# Patient Record
Sex: MALE | Race: WHITE | NOT HISPANIC OR LATINO | URBAN - METROPOLITAN AREA
[De-identification: names, ages, dates, MRNs, and addresses within clinical notes are randomized per-mention and may not be internally consistent; named-entity substitution may affect disease eponyms.]

---

## 2023-09-12 PROBLEM — R79.89 LOW TESTOSTERONE IN MALE: Status: ACTIVE | Noted: 2023-09-12

## 2023-09-12 PROBLEM — D22.5 MELANOCYTIC NEVI OF TRUNK: Status: ACTIVE | Noted: 2023-07-25

## 2023-09-12 PROBLEM — R41.89 BRAIN FOG: Status: ACTIVE | Noted: 2023-09-12

## 2023-09-12 PROBLEM — R68.82 DECREASED LIBIDO: Status: ACTIVE | Noted: 2023-09-12

## 2023-09-12 PROBLEM — L57.9 SKIN CHANGES DUE TO CHRONIC EXPOSURE TO NONIONIZING RADIATION, UNSPECIFIED: Status: ACTIVE | Noted: 2023-07-25

## 2023-09-13 ENCOUNTER — LAB (OUTPATIENT)
Dept: LAB | Facility: LAB | Age: 33
End: 2023-09-13

## 2023-09-13 ENCOUNTER — OFFICE VISIT (OUTPATIENT)
Dept: PRIMARY CARE | Facility: CLINIC | Age: 33
End: 2023-09-13
Payer: COMMERCIAL

## 2023-09-13 VITALS
SYSTOLIC BLOOD PRESSURE: 122 MMHG | HEART RATE: 70 BPM | BODY MASS INDEX: 28.31 KG/M2 | DIASTOLIC BLOOD PRESSURE: 80 MMHG | WEIGHT: 203 LBS

## 2023-09-13 DIAGNOSIS — Z11.4 ENCOUNTER FOR SCREENING FOR HIV: ICD-10-CM

## 2023-09-13 DIAGNOSIS — R79.89 LOW TESTOSTERONE IN MALE: ICD-10-CM

## 2023-09-13 DIAGNOSIS — Z11.59 NEED FOR HEPATITIS C SCREENING TEST: ICD-10-CM

## 2023-09-13 DIAGNOSIS — K58.9 IRRITABLE BOWEL SYNDROME WITHOUT DIARRHEA: ICD-10-CM

## 2023-09-13 DIAGNOSIS — Z00.00 ANNUAL PHYSICAL EXAM: ICD-10-CM

## 2023-09-13 DIAGNOSIS — Z00.00 ANNUAL PHYSICAL EXAM: Primary | ICD-10-CM

## 2023-09-13 DIAGNOSIS — R68.82 DECREASED LIBIDO: ICD-10-CM

## 2023-09-13 PROBLEM — M54.50 LOW BACK PAIN: Status: ACTIVE | Noted: 2023-09-13

## 2023-09-13 PROBLEM — R07.89 OTHER CHEST PAIN: Status: ACTIVE | Noted: 2023-09-13

## 2023-09-13 PROBLEM — R19.7 DIARRHEA: Status: ACTIVE | Noted: 2023-09-13

## 2023-09-13 PROBLEM — Z77.098 CONTACT WITH AND (SUSPECTED) EXPOSURE TO OTHER HAZARDOUS, CHIEFLY NONMEDICINAL, CHEMICALS: Status: ACTIVE | Noted: 2023-09-13

## 2023-09-13 PROBLEM — R76.11 POSITIVE REACTION TO TUBERCULIN SKIN TEST: Status: ACTIVE | Noted: 2023-09-13

## 2023-09-13 LAB
ALANINE AMINOTRANSFERASE (SGPT) (U/L) IN SER/PLAS: 21 U/L (ref 10–52)
ALBUMIN (G/DL) IN SER/PLAS: 4.6 G/DL (ref 3.4–5)
ALKALINE PHOSPHATASE (U/L) IN SER/PLAS: 47 U/L (ref 33–120)
ANION GAP IN SER/PLAS: 11 MMOL/L (ref 10–20)
ASPARTATE AMINOTRANSFERASE (SGOT) (U/L) IN SER/PLAS: 22 U/L (ref 9–39)
BASOPHILS (10*3/UL) IN BLOOD BY AUTOMATED COUNT: 0.03 X10E9/L (ref 0–0.1)
BASOPHILS/100 LEUKOCYTES IN BLOOD BY AUTOMATED COUNT: 0.5 % (ref 0–2)
BILIRUBIN TOTAL (MG/DL) IN SER/PLAS: 0.6 MG/DL (ref 0–1.2)
CALCIUM (MG/DL) IN SER/PLAS: 9.7 MG/DL (ref 8.6–10.3)
CARBON DIOXIDE, TOTAL (MMOL/L) IN SER/PLAS: 30 MMOL/L (ref 21–32)
CHLORIDE (MMOL/L) IN SER/PLAS: 106 MMOL/L (ref 98–107)
CHOLESTEROL (MG/DL) IN SER/PLAS: 138 MG/DL (ref 0–199)
CHOLESTEROL IN HDL (MG/DL) IN SER/PLAS: 39.6 MG/DL
CHOLESTEROL/HDL RATIO: 3.5
CREATININE (MG/DL) IN SER/PLAS: 1.05 MG/DL (ref 0.5–1.3)
EOSINOPHILS (10*3/UL) IN BLOOD BY AUTOMATED COUNT: 0.13 X10E9/L (ref 0–0.7)
EOSINOPHILS/100 LEUKOCYTES IN BLOOD BY AUTOMATED COUNT: 2.3 % (ref 0–6)
ERYTHROCYTE DISTRIBUTION WIDTH (RATIO) BY AUTOMATED COUNT: 11.9 % (ref 11.5–14.5)
ERYTHROCYTE MEAN CORPUSCULAR HEMOGLOBIN CONCENTRATION (G/DL) BY AUTOMATED: 34.6 G/DL (ref 32–36)
ERYTHROCYTE MEAN CORPUSCULAR VOLUME (FL) BY AUTOMATED COUNT: 87 FL (ref 80–100)
ERYTHROCYTES (10*6/UL) IN BLOOD BY AUTOMATED COUNT: 5.23 X10E12/L (ref 4.5–5.9)
GFR MALE: >90 ML/MIN/1.73M2
GLUCOSE (MG/DL) IN SER/PLAS: 90 MG/DL (ref 74–99)
HEMATOCRIT (%) IN BLOOD BY AUTOMATED COUNT: 45.4 % (ref 41–52)
HEMOGLOBIN (G/DL) IN BLOOD: 15.7 G/DL (ref 13.5–17.5)
IMMATURE GRANULOCYTES/100 LEUKOCYTES IN BLOOD BY AUTOMATED COUNT: 0.4 % (ref 0–0.9)
LDL: 72 MG/DL (ref 0–99)
LEUKOCYTES (10*3/UL) IN BLOOD BY AUTOMATED COUNT: 5.6 X10E9/L (ref 4.4–11.3)
LYMPHOCYTES (10*3/UL) IN BLOOD BY AUTOMATED COUNT: 1.41 X10E9/L (ref 1.2–4.8)
LYMPHOCYTES/100 LEUKOCYTES IN BLOOD BY AUTOMATED COUNT: 25.3 % (ref 13–44)
MONOCYTES (10*3/UL) IN BLOOD BY AUTOMATED COUNT: 0.46 X10E9/L (ref 0.1–1)
MONOCYTES/100 LEUKOCYTES IN BLOOD BY AUTOMATED COUNT: 8.2 % (ref 2–10)
NEUTROPHILS (10*3/UL) IN BLOOD BY AUTOMATED COUNT: 3.53 X10E9/L (ref 1.2–7.7)
NEUTROPHILS/100 LEUKOCYTES IN BLOOD BY AUTOMATED COUNT: 63.3 % (ref 40–80)
PLATELETS (10*3/UL) IN BLOOD AUTOMATED COUNT: 210 X10E9/L (ref 150–450)
POC APPEARANCE, URINE: CLEAR
POC BILIRUBIN, URINE: NEGATIVE
POC BLOOD, URINE: NEGATIVE
POC COLOR, URINE: YELLOW
POC GLUCOSE, URINE: NEGATIVE MG/DL
POC KETONES, URINE: NEGATIVE MG/DL
POC LEUKOCYTES, URINE: NEGATIVE
POC NITRITE,URINE: NEGATIVE
POC PH, URINE: 6 PH
POC PROTEIN, URINE: NEGATIVE MG/DL
POC SPECIFIC GRAVITY, URINE: 1.02
POC UROBILINOGEN, URINE: 0.2 EU/DL
POTASSIUM (MMOL/L) IN SER/PLAS: 4.5 MMOL/L (ref 3.5–5.3)
PROTEIN TOTAL: 7.2 G/DL (ref 6.4–8.2)
SODIUM (MMOL/L) IN SER/PLAS: 142 MMOL/L (ref 136–145)
THYROTROPIN (MIU/L) IN SER/PLAS BY DETECTION LIMIT <= 0.05 MIU/L: 2.19 MIU/L (ref 0.44–3.98)
TRIGLYCERIDE (MG/DL) IN SER/PLAS: 133 MG/DL (ref 0–149)
UREA NITROGEN (MG/DL) IN SER/PLAS: 12 MG/DL (ref 6–23)
VLDL: 27 MG/DL (ref 0–40)

## 2023-09-13 PROCEDURE — 1036F TOBACCO NON-USER: CPT | Performed by: FAMILY MEDICINE

## 2023-09-13 PROCEDURE — 81002 URINALYSIS NONAUTO W/O SCOPE: CPT | Performed by: FAMILY MEDICINE

## 2023-09-13 PROCEDURE — 85025 COMPLETE CBC W/AUTO DIFF WBC: CPT

## 2023-09-13 PROCEDURE — 87389 HIV-1 AG W/HIV-1&-2 AB AG IA: CPT

## 2023-09-13 PROCEDURE — 86803 HEPATITIS C AB TEST: CPT

## 2023-09-13 PROCEDURE — 99214 OFFICE O/P EST MOD 30 MIN: CPT | Performed by: FAMILY MEDICINE

## 2023-09-13 PROCEDURE — 84443 ASSAY THYROID STIM HORMONE: CPT

## 2023-09-13 PROCEDURE — 80053 COMPREHEN METABOLIC PANEL: CPT

## 2023-09-13 PROCEDURE — 99395 PREV VISIT EST AGE 18-39: CPT | Performed by: FAMILY MEDICINE

## 2023-09-13 PROCEDURE — 36415 COLL VENOUS BLD VENIPUNCTURE: CPT

## 2023-09-13 PROCEDURE — 84402 ASSAY OF FREE TESTOSTERONE: CPT

## 2023-09-13 PROCEDURE — 84403 ASSAY OF TOTAL TESTOSTERONE: CPT

## 2023-09-13 PROCEDURE — 80061 LIPID PANEL: CPT

## 2023-09-13 PROCEDURE — 93000 ELECTROCARDIOGRAM COMPLETE: CPT | Performed by: FAMILY MEDICINE

## 2023-09-13 ASSESSMENT — PATIENT HEALTH QUESTIONNAIRE - PHQ9
SUM OF ALL RESPONSES TO PHQ9 QUESTIONS 1 AND 2: 0
1. LITTLE INTEREST OR PLEASURE IN DOING THINGS: NOT AT ALL
2. FEELING DOWN, DEPRESSED OR HOPELESS: NOT AT ALL

## 2023-09-13 NOTE — PROGRESS NOTES
Subjective   Reason for Visit: Ashish Sandoval is an 33 y.o. male here for a Annual Exam.     Past Medical, Surgical, and Family History reviewed and updated in chart.    Reviewed all medications by prescribing practitioner or clinical pharmacist (such as prescriptions, OTCs, herbal therapies and supplements) and documented in the medical record.    HPI  1.  Health Maintenance:  Ashish is here for an annual physical examination, last done in September 2022 with physical exam blood work done at that time.  Immunizations are up-to-date. Tdap due Sep 2028 . Influenza denied today.   Fasting today and willing to have lab work completed.   Works out regularly and is attempting to eat a well balanced diet.     2.  Low testosterone/ Decreased Libido   Pt was following with Urology last year  Concluded that at this time he would just like to monitor levels yearly  Hoping to have both a free and total testosterone today    3.  IBS   In 2018 Pt was experiencing IBS symptoms and had a colonoscopy was given a clearance until age 44.   Currently experiencing change in stool caliber, not necessarily diarrhea, but sometimes loose, sometimes formed, but never seemingly the same, for which he is taking Metamucil daily.   Endorsed shift work and concerns for when he will be able to use the restroom endorsed forcing defication at sometimes.   Believes his GI issue may be related to anxiety.     Review of Systems  All pertinent positive symptoms are included in the history of present illness.    All other systems have been reviewed and are negative and noncontributory to this patient's current ailments.    Current Outpatient Medications   Medication Instructions    rifAXIMin (XIFAXAN) 550 mg, oral, 3 times daily     Allergies   Allergen Reactions    Bee Venom Protein (Honey Bee) Rash     Immunization History   Administered Date(s) Administered    Anthrax 07/13/2012, 09/11/2012, 02/14/2013    DTP 1989, 1989, 1989,  07/27/1990, 05/10/1994    Hep A, Unspecified 04/06/2011, 11/01/2011    Hepatitis B vaccine, adult (RECOMBIVAX, ENGERIX) 09/17/2007    Hib (PRP-D) 07/27/1990    Influenza, Split (incl. purified surface antigen) 03/31/2011    Influenza, Unspecified 09/01/2014    Influenza, injectable, MDCK, preservative free, quadrivalent 10/19/2018    Influenza, live, intranasal 10/21/2011, 09/11/2012    Influenza, live, intranasal, quadrivalent 10/01/2013, 10/15/2014    MMR vaccine, subcutaneous (MMR II) 07/27/1990, 05/10/1994, 06/24/2000    Meningococcal MCV4, Unspecified 10/01/2007    Meningococcal MCV4P 03/31/2011    Meningococcal MPSV4 03/31/2011    OPV 1989, 1989, 07/27/1990, 05/10/1994    Poliovirus vaccine, subcutaneous (IPOL) 03/31/2011    Td vaccine, age 7 years and older (TDVAX) 06/24/2000    Tdap vaccine, age 10 years and older (BOOSTRIX) 10/01/2007, 03/31/2011, 09/10/2018    Typhoid, oral 07/13/2012    Vaccinia (smallpox) 07/18/2012     Past Surgical History:   Procedure Laterality Date    VASECTOMY  05/16/2017    Surgery Vas Deferens Vasectomy     No family history on file.    Social History     Tobacco Use    Smoking status: Never    Smokeless tobacco: Never      Objective   Visit Vitals  /80   Pulse 70   Wt 92.1 kg (203 lb)   BMI 28.31 kg/m²   Smoking Status Never   BSA 2.15 m²      Physical Exam  CONSTITUTIONAL - well nourished, well developed, looks like stated age, in no acute distress, not ill-appearing, and not tired appearing  SKIN - normal skin color and pigmentation, normal skin turgor without rash, lesions, or nodules visualized  HEAD - no trauma, normocephalic  EYES - pupils are equal and reactive to light and normal external exam  ENT - TM's intact, no injection, no signs of infection, uvula midline, normal tongue movement and throat normal, no exudate  NECK - supple without rigidity, no neck mass was observed, no thyromegaly or thyroid nodules  CHEST - clear to auscultation, no wheezing,  no crackles and no rales, good effort  CARDIAC - regular rate and regular rhythm, no skipped beats, no murmur  ABDOMEN - no organomegaly, soft, nontender, nondistended, normal bowel sounds, no guarding/rebound/rigidity  EXTREMITIES - no edema, no deformities  NEUROLOGICAL - normal gait, normal balance, normal motor, no ataxia; alert, oriented and no focal signs  PSYCHIATRIC - alert, pleasant and cordial, age-appropriate  IMMUNOLOGIC - no cervical lymphadenopathy    Assessment/Plan   Problem List Items Addressed This Visit       Decreased libido    Current Assessment & Plan     Labs will be drawn to monitor levels          Relevant Orders    Testosterone,Free and Total    Low testosterone in male    Current Assessment & Plan     Labs will be drawn to monitor levels         Relevant Orders    Testosterone,Free and Total    Irritable bowel syndrome    Current Assessment & Plan     I suspect that your symptoms are still related to IBS  We talked about the use of the medication to see if they can help calm some of those symptoms  If no improvement after the medication, I would encourage you to make an appointment with GI, Dr. Corona         Relevant Medications    rifAXIMin (Xifaxan) 550 mg tablet     Other Visit Diagnoses       Annual physical exam    -  Primary    Complete history and physical examination was performed  EKG reveals normal sinus rhythm without acute changes  We will notify of test results once available    Relevant Orders    Lipid Panel    TSH with reflex to Free T4 if abnormal    Comprehensive Metabolic Panel    CBC and Auto Differential    HIV 1/2 Antigen/Antibody Screen with Reflex to Confirmation    Hepatitis C Antibody    ECG 12 lead (Clinic Performed) (Completed)    POCT UA (nonautomated) manually resulted (Completed)    Encounter for screening for HIV        Relevant Orders    HIV 1/2 Antigen/Antibody Screen with Reflex to Confirmation    Need for hepatitis C screening test        Relevant  Orders    Hepatitis C Antibody          Current Outpatient Medications   Medication Instructions    rifAXIMin (XIFAXAN) 550 mg, oral, 3 times daily     Patient Care Team:  Davide Marquez DO as PCP - General (Family Medicine)  Davide Marquez DO as PCP - [facility] PCP

## 2023-09-14 LAB
HEPATITIS C VIRUS AB PRESENCE IN SERUM: NONREACTIVE
HIV 1/ 2 AG/AB SCREEN: NONREACTIVE

## 2023-09-14 NOTE — RESULT ENCOUNTER NOTE
Cholesterol 138, 39, 72, 133 previously 163, 55, 90, 89.  Great job, continued excellent work with diet and exercise  HIV, hepatitis C, sugar, kidney, liver, electrolytes, thyroid, complete blood cell count normal across-the-board  Testosterone and free testosterone are still pending

## 2023-09-19 LAB
TESTOSTERONE FREE (CHAN): 84.5 PG/ML (ref 35–155)
TESTOSTERONE,TOTAL,LC-MS/MS: 491 NG/DL (ref 250–1100)

## 2024-09-25 ENCOUNTER — APPOINTMENT (OUTPATIENT)
Dept: PRIMARY CARE | Facility: CLINIC | Age: 34
End: 2024-09-25
Payer: COMMERCIAL

## 2024-09-25 ENCOUNTER — LAB (OUTPATIENT)
Dept: LAB | Facility: LAB | Age: 34
End: 2024-09-25
Payer: COMMERCIAL

## 2024-09-25 VITALS
BODY MASS INDEX: 28.87 KG/M2 | HEART RATE: 63 BPM | SYSTOLIC BLOOD PRESSURE: 124 MMHG | WEIGHT: 207 LBS | DIASTOLIC BLOOD PRESSURE: 80 MMHG

## 2024-09-25 DIAGNOSIS — Z77.011 LEAD EXPOSURE: ICD-10-CM

## 2024-09-25 DIAGNOSIS — R79.89 LOW TESTOSTERONE IN MALE: ICD-10-CM

## 2024-09-25 DIAGNOSIS — L57.9 SKIN CHANGES DUE TO CHRONIC EXPOSURE TO NONIONIZING RADIATION, UNSPECIFIED: ICD-10-CM

## 2024-09-25 DIAGNOSIS — Z00.00 ANNUAL PHYSICAL EXAM: ICD-10-CM

## 2024-09-25 DIAGNOSIS — Z00.00 ANNUAL PHYSICAL EXAM: Primary | ICD-10-CM

## 2024-09-25 PROBLEM — D22.5 MELANOCYTIC NEVI OF TRUNK: Status: RESOLVED | Noted: 2023-07-25 | Resolved: 2024-09-25

## 2024-09-25 PROBLEM — R07.89 OTHER CHEST PAIN: Status: RESOLVED | Noted: 2023-09-13 | Resolved: 2024-09-25

## 2024-09-25 PROBLEM — R41.89 BRAIN FOG: Status: RESOLVED | Noted: 2023-09-12 | Resolved: 2024-09-25

## 2024-09-25 PROBLEM — M54.50 LOW BACK PAIN: Status: RESOLVED | Noted: 2023-09-13 | Resolved: 2024-09-25

## 2024-09-25 PROBLEM — R19.7 DIARRHEA: Status: RESOLVED | Noted: 2023-09-13 | Resolved: 2024-09-25

## 2024-09-25 PROBLEM — R76.11 POSITIVE REACTION TO TUBERCULIN SKIN TEST: Status: RESOLVED | Noted: 2023-09-13 | Resolved: 2024-09-25

## 2024-09-25 LAB
ALBUMIN SERPL BCP-MCNC: 4.6 G/DL (ref 3.4–5)
ALP SERPL-CCNC: 47 U/L (ref 33–120)
ALT SERPL W P-5'-P-CCNC: 24 U/L (ref 10–52)
ANION GAP SERPL CALC-SCNC: 10 MMOL/L (ref 10–20)
AST SERPL W P-5'-P-CCNC: 16 U/L (ref 9–39)
BASOPHILS # BLD AUTO: 0.04 X10*3/UL (ref 0–0.1)
BASOPHILS NFR BLD AUTO: 0.7 %
BILIRUB SERPL-MCNC: 1 MG/DL (ref 0–1.2)
BUN SERPL-MCNC: 13 MG/DL (ref 6–23)
CALCIUM SERPL-MCNC: 9.3 MG/DL (ref 8.6–10.3)
CHLORIDE SERPL-SCNC: 106 MMOL/L (ref 98–107)
CHOLEST SERPL-MCNC: 159 MG/DL (ref 0–199)
CHOLESTEROL/HDL RATIO: 3.8
CO2 SERPL-SCNC: 29 MMOL/L (ref 21–32)
CREAT SERPL-MCNC: 1.15 MG/DL (ref 0.5–1.3)
EGFRCR SERPLBLD CKD-EPI 2021: 85 ML/MIN/1.73M*2
EOSINOPHIL # BLD AUTO: 0.12 X10*3/UL (ref 0–0.7)
EOSINOPHIL NFR BLD AUTO: 2 %
ERYTHROCYTE [DISTWIDTH] IN BLOOD BY AUTOMATED COUNT: 12.1 % (ref 11.5–14.5)
GLUCOSE SERPL-MCNC: 99 MG/DL (ref 74–99)
HCT VFR BLD AUTO: 45.9 % (ref 41–52)
HDLC SERPL-MCNC: 41.5 MG/DL
HGB BLD-MCNC: 15.7 G/DL (ref 13.5–17.5)
IMM GRANULOCYTES # BLD AUTO: 0.02 X10*3/UL (ref 0–0.7)
IMM GRANULOCYTES NFR BLD AUTO: 0.3 % (ref 0–0.9)
LDLC SERPL CALC-MCNC: 94 MG/DL
LEAD BLD-MCNC: 1.8 UG/DL
LEAD BLDV-MCNC: NORMAL UG/DL
LYMPHOCYTES # BLD AUTO: 1.41 X10*3/UL (ref 1.2–4.8)
LYMPHOCYTES NFR BLD AUTO: 23.1 %
MCH RBC QN AUTO: 30.4 PG (ref 26–34)
MCHC RBC AUTO-ENTMCNC: 34.2 G/DL (ref 32–36)
MCV RBC AUTO: 89 FL (ref 80–100)
MONOCYTES # BLD AUTO: 0.61 X10*3/UL (ref 0.1–1)
MONOCYTES NFR BLD AUTO: 10 %
NEUTROPHILS # BLD AUTO: 3.9 X10*3/UL (ref 1.2–7.7)
NEUTROPHILS NFR BLD AUTO: 63.9 %
NON HDL CHOLESTEROL: 118 MG/DL (ref 0–149)
NRBC BLD-RTO: 0 /100 WBCS (ref 0–0)
PLATELET # BLD AUTO: 204 X10*3/UL (ref 150–450)
POC APPEARANCE, URINE: CLEAR
POC BILIRUBIN, URINE: NEGATIVE
POC BLOOD, URINE: NEGATIVE
POC COLOR, URINE: YELLOW
POC GLUCOSE, URINE: NEGATIVE MG/DL
POC KETONES, URINE: NEGATIVE MG/DL
POC LEUKOCYTES, URINE: NEGATIVE
POC NITRITE,URINE: NEGATIVE
POC PH, URINE: 7 PH
POC PROTEIN, URINE: NEGATIVE MG/DL
POC SPECIFIC GRAVITY, URINE: 1.01
POC UROBILINOGEN, URINE: 0.2 EU/DL
POTASSIUM SERPL-SCNC: 4.2 MMOL/L (ref 3.5–5.3)
PROT SERPL-MCNC: 6.8 G/DL (ref 6.4–8.2)
RBC # BLD AUTO: 5.16 X10*6/UL (ref 4.5–5.9)
SODIUM SERPL-SCNC: 141 MMOL/L (ref 136–145)
TRIGL SERPL-MCNC: 119 MG/DL (ref 0–149)
TSH SERPL-ACNC: 1.96 MIU/L (ref 0.44–3.98)
VLDL: 24 MG/DL (ref 0–40)
WBC # BLD AUTO: 6.1 X10*3/UL (ref 4.4–11.3)

## 2024-09-25 PROCEDURE — 84443 ASSAY THYROID STIM HORMONE: CPT

## 2024-09-25 PROCEDURE — 80053 COMPREHEN METABOLIC PANEL: CPT

## 2024-09-25 PROCEDURE — 93000 ELECTROCARDIOGRAM COMPLETE: CPT | Performed by: FAMILY MEDICINE

## 2024-09-25 PROCEDURE — 84402 ASSAY OF FREE TESTOSTERONE: CPT

## 2024-09-25 PROCEDURE — 85025 COMPLETE CBC W/AUTO DIFF WBC: CPT

## 2024-09-25 PROCEDURE — 83655 ASSAY OF LEAD: CPT

## 2024-09-25 PROCEDURE — 99395 PREV VISIT EST AGE 18-39: CPT | Performed by: FAMILY MEDICINE

## 2024-09-25 PROCEDURE — 80061 LIPID PANEL: CPT

## 2024-09-25 PROCEDURE — 1036F TOBACCO NON-USER: CPT | Performed by: FAMILY MEDICINE

## 2024-09-25 PROCEDURE — 36415 COLL VENOUS BLD VENIPUNCTURE: CPT

## 2024-09-25 PROCEDURE — 81002 URINALYSIS NONAUTO W/O SCOPE: CPT | Performed by: FAMILY MEDICINE

## 2024-09-25 NOTE — RESULT ENCOUNTER NOTE
Cholesterol, sugar, kidney, liver, thyroid, complete blood cell count look excellent  Lead level and testosterone levels are still pending

## 2024-09-25 NOTE — ASSESSMENT & PLAN NOTE
Provided you information regarding [facility] in [place]  Please feel free to set up a skin cancer screen at your earliest convenience

## 2024-09-25 NOTE — PROGRESS NOTES
Subjective   Patient ID: Ashish Sandoval is a 34 y.o. male who presents for Annual Exam.    Past Medical, Surgical, and Family History reviewed and updated in chart.    Reviewed all medications by prescribing practitioner or clinical pharmacist (such as prescriptions, OTCs, herbal therapies and supplements) and documented in the medical record.    HPI  1.  Physical exam  Ashish is here for an annual physical examination, last done in September 2023  Immunizations are up-to-date. Tdap due Sep 2028, declined influenza vaccine  Works out regularly and is attempting to eat a well balanced diet  Used to see Dr. Kumar in dermatology for skin cancer screen, but he has since retired, requesting dermatology information     2.  Low testosterone/Decreased Libido   In the past, had followed urology for this concern  Last year we had done a free testosterone and total testosterone and he would like to have this monitored annually    3.  IBS   In 2018 IBS symptoms were experienced and had a colonoscopy was given a clearance until age 44  After last year's visit, Xifaxan was provided with the idea that if there was no improvement, GI consult would be made  There was some slight improvement with the medication, but [facility], and they came up with a plan to have him eat first thing in the morning and then take Metamucil and that has seemingly stabilized his IBS symptoms    4.  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    All other systems have been reviewed and are negative and noncontributory to this patient's current ailments.    Past Medical History:   Diagnosis Date    Immunization not carried out because of patient refusal 12/09/2021    Influenza vaccination declined    Lactose intolerance,  unspecified     Lactose intolerance     Past Surgical History:   Procedure Laterality Date    VASECTOMY  05/16/2017    Surgery Vas Deferens Vasectomy     Social History     Tobacco Use    Smoking status: Never    Smokeless tobacco: Never     No family history on file.  Immunization History   Administered Date(s) Administered    Anthrax 07/13/2012, 09/11/2012, 02/14/2013    DTP 1989, 1989, 1989, 07/27/1990, 05/10/1994    Flu vaccine, quadrivalent, no egg protein, age 6 month or greater (FLUCELVAX) 10/19/2018    Hep A, Unspecified 04/06/2011, 11/01/2011    Hepatitis B vaccine, adult *Check Product/Dose* 09/17/2007    Hib (PRP-D) 07/27/1990    Influenza, Split (incl. purified surface antigen) 03/31/2011    Influenza, Unspecified 09/01/2014    Influenza, live, intranasal 10/21/2011, 09/11/2012    Influenza, live, intranasal, quadrivalent 10/01/2013, 10/15/2014    MMR vaccine, subcutaneous (MMR II) 07/27/1990, 05/10/1994, 06/24/2000    Meningococcal ACWY-D (Menactra) 4-valent conjugate vaccine 03/31/2011    Meningococcal MCV4, Unspecified 10/01/2007    Meningococcal MPSV4 03/31/2011    OPV 1989, 1989, 07/27/1990, 05/10/1994    Poliovirus vaccine, subcutaneous (IPOL) 03/31/2011    Td vaccine, age 7 years and older (TDVAX) 06/24/2000    Tdap vaccine, age 7 year and older (BOOSTRIX, ADACEL) 10/01/2007, 03/31/2011, 09/10/2018    Typhoid, oral 07/13/2012    Vaccinia (smallpox) 07/18/2012     No current outpatient medications  Allergies   Allergen Reactions    Bee Venom Protein (Honey Bee) Rash    Insects Extract Rash     Objective   Vitals:    09/25/24 0800   BP: 124/80   Pulse: 63   Weight: 93.9 kg (207 lb)     Body mass index is 28.87 kg/m².    BP Readings from Last 3 Encounters:   09/25/24 124/80   09/13/23 122/80   09/01/22 122/74      Wt Readings from Last 3 Encounters:   09/25/24 93.9 kg (207 lb)   09/13/23 92.1 kg (203 lb)   09/01/22 90.3 kg (199 lb)        Office Visit on 09/25/2024    Component Date Value    POC Color, Urine 09/25/2024 Yellow     POC Appearance, Urine 09/25/2024 Clear     POC Glucose, Urine 09/25/2024 NEGATIVE     POC Bilirubin, Urine 09/25/2024 NEGATIVE     POC Ketones, Urine 09/25/2024 NEGATIVE     POC Specific Gravity, Ur* 09/25/2024 1.015     POC Blood, Urine 09/25/2024 NEGATIVE     POC PH, Urine 09/25/2024 7.0     POC Protein, Urine 09/25/2024 NEGATIVE     POC Urobilinogen, Urine 09/25/2024 0.2     Poc Nitrite, Urine 09/25/2024 NEGATIVE     POC Leukocytes, Urine 09/25/2024 NEGATIVE      Physical Exam  CONSTITUTIONAL - well nourished, well developed, looks like stated age, in no acute distress, not ill-appearing, and not tired appearing  SKIN - normal skin color and pigmentation, normal skin turgor without rash, lesions, or nodules visualized  HEAD - no trauma, normocephalic  EYES - pupils are equal and reactive to light, extraocular muscles are intact, and normal external exam  ENT - TM's intact, no injection, no signs of infection, uvula midline, normal tongue movement and throat normal, no exudate  NECK - supple without rigidity, no neck mass was observed, no thyromegaly or thyroid nodules  CHEST - clear to auscultation, no wheezing, no crackles and no rales, good effort  CARDIAC - regular rate and regular rhythm, no skipped beats, no murmur  ABDOMEN - no organomegaly, soft, nontender, nondistended, normal bowel sounds, no guarding/rebound/rigidity, negative McBurney sign and negative Roberts sign  EXTREMITIES - no obvious or evident edema, no obvious or evident deformities  NEUROLOGICAL - normal gait, normal balance, normal motor, no ataxia, DTRs equal and symmetrical; alert, oriented and no focal signs  PSYCHIATRIC - alert, pleasant and cordial, age-appropriate  IMMUNOLOGIC - no cervical lymphadenopathy    Assessment/Plan   Problem List Items Addressed This Visit       Low testosterone in male     Labs will be drawn to monitor levels         Relevant Orders     Testosterone, total and free    Skin changes due to chronic exposure to nonionizing radiation, unspecified     Provided you information regarding [facility] group in [place]  Please feel free to set up a skin cancer screen at your earliest convenience         Lead exposure     We will obtain a lead level to ensure nontoxic         Relevant Orders    Lead, blood    Annual physical exam - Primary     Complete history and physical examination was performed  EKG reveals normal sinus rhythm without acute changes  We will notify of test results once available         Relevant Orders    Lipid Panel    TSH with reflex to Free T4 if abnormal    Comprehensive Metabolic Panel    CBC and Auto Differential    POCT UA (nonautomated) manually resulted (Completed)    ECG 12 Lead (Completed)

## 2024-09-26 NOTE — RESULT ENCOUNTER NOTE
Lead level looks to be normal with less than 3.5 being normal, you are at 1.8  Testosterone level is still pending

## 2024-09-29 LAB
TESTOSTERONE FREE (CHAN): 103.5 PG/ML (ref 35–155)
TESTOSTERONE,TOTAL,LC-MS/MS: 609 NG/DL (ref 250–1100)

## 2025-03-05 ENCOUNTER — OFFICE VISIT (OUTPATIENT)
Dept: PRIMARY CARE | Facility: CLINIC | Age: 35
End: 2025-03-05
Payer: COMMERCIAL

## 2025-03-05 VITALS
WEIGHT: 209 LBS | HEIGHT: 71 IN | SYSTOLIC BLOOD PRESSURE: 130 MMHG | BODY MASS INDEX: 29.26 KG/M2 | HEART RATE: 68 BPM | DIASTOLIC BLOOD PRESSURE: 76 MMHG

## 2025-03-05 DIAGNOSIS — K58.9 IRRITABLE BOWEL SYNDROME WITHOUT DIARRHEA: Primary | ICD-10-CM

## 2025-03-05 DIAGNOSIS — G43.009 MIGRAINE WITHOUT AURA AND WITHOUT STATUS MIGRAINOSUS, NOT INTRACTABLE: ICD-10-CM

## 2025-03-05 DIAGNOSIS — K21.9 GASTROESOPHAGEAL REFLUX DISEASE WITHOUT ESOPHAGITIS: ICD-10-CM

## 2025-03-05 PROCEDURE — 99214 OFFICE O/P EST MOD 30 MIN: CPT | Performed by: FAMILY MEDICINE

## 2025-03-05 PROCEDURE — 3008F BODY MASS INDEX DOCD: CPT | Performed by: FAMILY MEDICINE

## 2025-03-05 PROCEDURE — 1036F TOBACCO NON-USER: CPT | Performed by: FAMILY MEDICINE

## 2025-03-05 ASSESSMENT — PATIENT HEALTH QUESTIONNAIRE - PHQ9
2. FEELING DOWN, DEPRESSED OR HOPELESS: NOT AT ALL
1. LITTLE INTEREST OR PLEASURE IN DOING THINGS: NOT AT ALL
SUM OF ALL RESPONSES TO PHQ9 QUESTIONS 1 AND 2: 0

## 2025-03-05 NOTE — ASSESSMENT & PLAN NOTE
[facility] to follow with [facility] for [facility] concern  Continue to try to avoid potential triggers  We will put together a letter for you indicating the possible causality from your duty in the  exposed to toxic chemicals while serving in [place]

## 2025-03-05 NOTE — ASSESSMENT & PLAN NOTE
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

## 2025-03-05 NOTE — PROGRESS NOTES
Chief Complaint  Patient ID: Ashish Sandoval is a 34 y.o. male who presents for GERD and Headache.    Past Medical, Surgical, and Family History reviewed and updated in chart.  Reviewed all medications by prescribing practitioner or clinical pharmacist (such as prescriptions, OTCs, herbal therapies and supplements) and documented in the medical record.    History of Present Illness  Ashish has been diagnosed with reflux disease and migraine headaches [facility]. 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 These substances have been scientifically linked to multiple chronic health conditions. Ashish is concerned that these exposures, in addition to his diagnosis of IBS, may be linked to his  service. He is requesting a letter of causality to assist in obtaining disability benefits through the  for these health concerns.    1. GERD    Ashish began experiencing noticeable GERD symptoms in 2019, which have been managed through the [facility]. He was prescribed Omeprazole in 2019, which was effective at the time, and he currently uses it over-the-counter as needed. He experiences food regurgitation and acid in his throat when burping, which causes pain. Occasionally, he also experiences a cough while sleeping. Ashish uses an adjustable mattress to elevate his head at night, which provides some relief. He has made lifestyle changes, including adjusting meal timing, abstaining from alcohol since July 2024, and avoiding dairy. Due to his IBS, Ashish prefers to avoid medications and only takes Omeprazole when absolutely necessary.    2. Low testosterone/Decreased Libido    In the past, Ashish followed up with urology for this concern. Last year, we  conducted tests for free and total testosterone, and he would like to continue monitoring these levels annually. He does not currently have any issues but wishes to continue monitoring.    3. IBS    Ashish began experiencing IBS symptoms in 2018 and underwent a colonoscopy, which cleared him until age 44. After last year's visit, he was prescribed Xifaxan, with the understanding that a GI consult would be pursued if there was no improvement. There was slight improvement with the medication. He also consults with a [facility], and together they developed a plan for him to eat first thing in the morning and then take Metamucil, which has seemingly stabilized his IBS symptoms when he is on day shift. He has become more aware of his triggers and manages this condition daily.    4. Headaches    Ashish's headaches began in 2019 and occur approximately twice a month. These have been managed through the [facility]. He has tried taking 800 mg of Ibuprofen or Tylenol, but neither provided relief.  He was offered prescription medication through the [facility], but he is not interested in pursuing those medications as he is concerned about potential side effects.  He experiences sensitivity to light and noise, a hungover feeling, and nausea during these headaches, which last around 2-4 hours. Napping with blackout curtains is the only effective treatment he has found.     At work, when experiencing a headache, he sits in a quiet, dark room to try to minimize the symptoms. He stopped drinking alcohol in July 2024 to see if it would help, but it did not change or decrease his headaches.    Review of Systems  All pertinent positive symptoms are included in the history of present illness.    All other systems have been reviewed and are negative and noncontributory to this patient's current ailments.    Past Medical History  He has a past medical history of Immunization not carried out because of patient refusal (12/09/2021) and Lactose  "intolerance, unspecified.    Surgical History  He has a past surgical history that includes Vasectomy (05/16/2017).     Social History  He reports that he has never smoked. He has never used smokeless tobacco. No history on file for alcohol use and drug use.    No family history on file.  No outpatient medications prior to visit.     No facility-administered medications prior to visit.     Allergies  Bee venom protein (honey bee) and Insects extract    Immunization History   Administered Date(s) Administered    Anthrax 07/13/2012, 09/11/2012, 02/14/2013    DTP 1989, 1989, 1989, 07/27/1990, 05/10/1994    Flu vaccine, quadrivalent, no egg protein, age 6 month or greater (FLUCELVAX) 10/19/2018    Hep A, Unspecified 04/06/2011, 11/01/2011    Hepatitis B vaccine, adult *Check Product/Dose* 09/17/2007    Hib (PRP-D) 07/27/1990    Influenza, Split (incl. purified surface antigen) 03/31/2011    Influenza, Unspecified 09/01/2014    Influenza, live, intranasal 10/21/2011, 09/11/2012    Influenza, live, intranasal, quadrivalent 10/01/2013, 10/15/2014    MMR vaccine, subcutaneous (MMR II) 07/27/1990, 05/10/1994, 06/24/2000    Meningococcal ACWY-D (Menactra) 4-valent conjugate vaccine 03/31/2011    Meningococcal MCV4, Unspecified 10/01/2007    Meningococcal MPSV4 03/31/2011    OPV 1989, 1989, 07/27/1990, 05/10/1994    Poliovirus vaccine, subcutaneous (IPOL) 03/31/2011    Td vaccine, age 7 years and older (TDVAX) 06/24/2000    Tdap vaccine, age 7 year and older (BOOSTRIX, ADACEL) 10/01/2007, 03/31/2011, 09/10/2018    Typhoid, oral 07/13/2012    Vaccinia (smallpox) 07/18/2012     Objective   Visit Vitals  /76   Pulse 68   Ht 1.803 m (5' 11\")   Wt 94.8 kg (209 lb)   BMI 29.15 kg/m²   Smoking Status Never   BSA 2.18 m²        BP Readings from Last 3 Encounters:   03/05/25 130/76   09/25/24 124/80   09/13/23 122/80      Wt Readings from Last 3 Encounters:   03/05/25 94.8 kg (209 lb)   09/25/24 93.9 " kg (207 lb)   09/13/23 92.1 kg (203 lb)      Vision  No results found.    Relevant Results  No visits with results within 1 Month(s) from this visit.   Latest known visit with results is:   Lab on 09/25/2024   Component Date Value    Cholesterol 09/25/2024 159     HDL-Cholesterol 09/25/2024 41.5     Cholesterol/HDL Ratio 09/25/2024 3.8     LDL Calculated 09/25/2024 94     VLDL 09/25/2024 24     Triglycerides 09/25/2024 119     Non HDL Cholesterol 09/25/2024 118     Thyroid Stimulating Horm* 09/25/2024 1.96     Glucose 09/25/2024 99     Sodium 09/25/2024 141     Potassium 09/25/2024 4.2     Chloride 09/25/2024 106     Bicarbonate 09/25/2024 29     Anion Gap 09/25/2024 10     Urea Nitrogen 09/25/2024 13     Creatinine 09/25/2024 1.15     eGFR 09/25/2024 85     Calcium 09/25/2024 9.3     Albumin 09/25/2024 4.6     Alkaline Phosphatase 09/25/2024 47     Total Protein 09/25/2024 6.8     AST 09/25/2024 16     Bilirubin, Total 09/25/2024 1.0     ALT 09/25/2024 24     WBC 09/25/2024 6.1     nRBC 09/25/2024 0.0     RBC 09/25/2024 5.16     Hemoglobin 09/25/2024 15.7     Hematocrit 09/25/2024 45.9     MCV 09/25/2024 89     MCH 09/25/2024 30.4     MCHC 09/25/2024 34.2     RDW 09/25/2024 12.1     Platelets 09/25/2024 204     Neutrophils % 09/25/2024 63.9     Immature Granulocytes %,* 09/25/2024 0.3     Lymphocytes % 09/25/2024 23.1     Monocytes % 09/25/2024 10.0     Eosinophils % 09/25/2024 2.0     Basophils % 09/25/2024 0.7     Neutrophils Absolute 09/25/2024 3.90     Immature Granulocytes Ab* 09/25/2024 0.02     Lymphocytes Absolute 09/25/2024 1.41     Monocytes Absolute 09/25/2024 0.61     Eosinophils Absolute 09/25/2024 0.12     Basophils Absolute 09/25/2024 0.04     Testosterone, Free 09/25/2024 103.5     Testosterone, Total, LC-* 09/25/2024 609     Lead, Venous 09/25/2024 1.8      The ASCVD Risk score (Angelia KNOWLES, et al., 2019) failed to calculate for the following reasons:    The 2019 ASCVD risk score is only valid for  ages 40 to 79    Physical Exam  CONSTITUTIONAL - well nourished, well developed, looks like stated age, in no acute distress, not ill-appearing, and not tired appearing  SKIN - normal skin color and pigmentation, normal skin turgor without rash, lesions, or nodules visualized  HEAD - no trauma, normocephalic  EYES - extraocular muscles are intact, and normal external exam  ENT - TM's intact, no injection, no signs of infection, uvula midline, normal tongue movement and throat normal, no exudate  CHEST - clear to auscultation, no wheezing, no crackles and no rales, good effort  CARDIAC - regular rate and regular rhythm, no skipped beats, no murmur  ABDOMEN - no organomegaly, soft, nontender, nondistended, normal bowel sounds, no guarding/rebound/rigidity, negative McBurney sign and negative Roberts sign  EXTREMITIES - no obvious or evident edema, no obvious or evident deformities  NEUROLOGICAL - normal gait, normal balance, normal motor, no ataxia, DTRs equal and symmetrical; alert, oriented and no focal signs  PSYCHIATRIC - alert, pleasant and cordial, age-appropriate  IMMUNOLOGIC - no cervical lymphadenopathy    Assessment and Plan  Problem List Items Addressed This Visit       Irritable bowel syndrome - Primary     Continue current plan of Metamucil in the morning as it seems to be working for you         Migraine without aura and without status migrainosus, not intractable     Continue to follow with [facility] physician for this concern  We will put together a letter for you indicating the possible causality from your duty in the  exposed to toxic chemicals while serving in [facility]         Gastroesophageal reflux disease without esophagitis     Continue to follow with [facility] physician for this concern  Continue to try to avoid potential triggers  We will put together a letter for you indicating the possible causality from your duty in the  exposed to toxic chemicals [facility]

## 2025-08-14 ENCOUNTER — APPOINTMENT (OUTPATIENT)
Dept: GASTROENTEROLOGY | Facility: CLINIC | Age: 35
End: 2025-08-14
Payer: COMMERCIAL

## 2025-08-14 VITALS — BODY MASS INDEX: 29.12 KG/M2 | HEART RATE: 73 BPM | WEIGHT: 208 LBS | HEIGHT: 71 IN

## 2025-08-14 DIAGNOSIS — K58.1 IRRITABLE BOWEL SYNDROME WITH CONSTIPATION: Primary | ICD-10-CM

## 2025-08-14 PROCEDURE — 3008F BODY MASS INDEX DOCD: CPT | Performed by: INTERNAL MEDICINE

## 2025-08-14 PROCEDURE — 99214 OFFICE O/P EST MOD 30 MIN: CPT | Performed by: INTERNAL MEDICINE

## 2025-08-14 PROCEDURE — 1036F TOBACCO NON-USER: CPT | Performed by: INTERNAL MEDICINE

## 2025-08-14 RX ORDER — BISMUTH SUBSALICYLATE 262 MG
1 TABLET,CHEWABLE ORAL DAILY
COMMUNITY

## 2025-08-14 RX ORDER — SILDENAFIL 50 MG/1
TABLET, FILM COATED ORAL AS NEEDED
COMMUNITY
Start: 2025-04-12

## 2025-08-14 RX ORDER — HYDROCORTISONE ACETATE 25 MG/1
SUPPOSITORY RECTAL
COMMUNITY
Start: 2025-05-23